# Patient Record
Sex: FEMALE | Race: WHITE | Employment: FULL TIME | URBAN - METROPOLITAN AREA
[De-identification: names, ages, dates, MRNs, and addresses within clinical notes are randomized per-mention and may not be internally consistent; named-entity substitution may affect disease eponyms.]

---

## 2019-11-25 ENCOUNTER — ANCILLARY PROCEDURE (OUTPATIENT)
Dept: GENERAL RADIOLOGY | Facility: CLINIC | Age: 39
End: 2019-11-25
Attending: FAMILY MEDICINE
Payer: COMMERCIAL

## 2019-11-25 ENCOUNTER — NURSE TRIAGE (OUTPATIENT)
Dept: NURSING | Facility: CLINIC | Age: 39
End: 2019-11-25

## 2019-11-25 ENCOUNTER — OFFICE VISIT (OUTPATIENT)
Dept: URGENT CARE | Facility: URGENT CARE | Age: 39
End: 2019-11-25
Payer: COMMERCIAL

## 2019-11-25 VITALS
SYSTOLIC BLOOD PRESSURE: 148 MMHG | OXYGEN SATURATION: 98 % | DIASTOLIC BLOOD PRESSURE: 89 MMHG | TEMPERATURE: 98.1 F | HEART RATE: 101 BPM

## 2019-11-25 DIAGNOSIS — M79.644 PAIN OF FINGER OF RIGHT HAND: ICD-10-CM

## 2019-11-25 DIAGNOSIS — M79.644 PAIN OF FINGER OF RIGHT HAND: Primary | ICD-10-CM

## 2019-11-25 PROCEDURE — 99203 OFFICE O/P NEW LOW 30 MIN: CPT | Performed by: FAMILY MEDICINE

## 2019-11-25 PROCEDURE — 73140 X-RAY EXAM OF FINGER(S): CPT | Mod: RT

## 2019-11-25 RX ORDER — HYDROCODONE BITARTRATE AND ACETAMINOPHEN 5; 325 MG/1; MG/1
1 TABLET ORAL EVERY 6 HOURS PRN
Qty: 10 TABLET | Refills: 0 | Status: SHIPPED | OUTPATIENT
Start: 2019-11-25 | End: 2019-11-25

## 2019-11-25 RX ORDER — HYDROCODONE BITARTRATE AND ACETAMINOPHEN 5; 325 MG/1; MG/1
1 TABLET ORAL EVERY 6 HOURS PRN
Qty: 10 TABLET | Refills: 0 | Status: SHIPPED | OUTPATIENT
Start: 2019-11-25

## 2019-11-25 SDOH — HEALTH STABILITY: MENTAL HEALTH: HOW OFTEN DO YOU HAVE A DRINK CONTAINING ALCOHOL?: NEVER

## 2019-11-26 NOTE — TELEPHONE ENCOUNTER
S: Patient calling about RX.  B: Was at [facility] this evening.  A RX for Narco was e-scribed to [facility] at [facility].  It is marked as confirmed.  Writer spoke with Urgent care will wait till 2000 to see if RX arrives. Writer informed patient by leaving a voice mail on her cell phone.  A: Writer called [facility] at 2000 it has not arrived yet.  Writer called [facility] and Dr. Mayen will e-scribe again.   R: Writer called Ekaterina to let her know provider is sending RX again.  Darby Buckner RN, [facility]      Reason for Disposition    Health Information question, no triage required and triager able to answer question    Protocols used: INFORMATION ONLY CALL-A-

## 2019-11-26 NOTE — PROGRESS NOTES
SUBJECTIVE:  Chief Complaint   Patient presents with     Urgent Care     Trauma     slammed finger in door on staurday. Finger is still hurting     Ekaterina Newberry is a 39 year old female who presents with a chief complaint of right middle finger pain, swelling, tenderness and decreased range of motion.  Symptoms began 3 day(s) ago, are moderate and sudden onset and worsening  Context:  Injury:Yes: as above   R handed.  Pain exacerbated by flexion/extension Relieved by ice.  She treated it initially with ice. This is the first time this type of injury has occurred to this patient.     No past medical history on file.  No current outpatient medications on file.     Social History     Tobacco Use     Smoking status: Never Smoker     Smokeless tobacco: Never Used   Substance Use Topics     Alcohol use: Never     Frequency: Never       ROS:  CONSTITUTIONAL:NEGATIVE for fever, chills, change in weight  NEURO: NEGATIVE for weakness, dizziness or paresthesias    EXAM:   BP (!) 148/89   Pulse 101   Temp 98.1  F (36.7  C)   SpO2 98%   M/S Exam:R middle finger with subungual hematoma and bruising extending proximally   Dec ROM at DIP related to swelling   GENERAL APPEARANCE: healthy and mild distress  EXTREMITIES: peripheral pulses normal  NEURO: Normal strength and tone, sensory exam grossly normal, mentation intact and speech normal    X-RAY was done.    ASSESSMENT:    Encounter Diagnosis   Name Primary?     Pain of finger of right hand Yes     I feel as though there is a tuft fracture best noted on ap view   Radiology to review xrays and I will communicate new findings  Pain meds   Discussed draining hematoma but would make this an open fracture   Avoid for now   Splint (otc)   Pt instructed to come back to the clinic for worsening sx    Ice, rest    Pain meds given  PLAN:  See orders in epic